# Patient Record
Sex: FEMALE | Race: WHITE
[De-identification: names, ages, dates, MRNs, and addresses within clinical notes are randomized per-mention and may not be internally consistent; named-entity substitution may affect disease eponyms.]

---

## 2019-12-18 ENCOUNTER — HOSPITAL ENCOUNTER (OUTPATIENT)
Dept: HOSPITAL 72 - SUR | Age: 34
Discharge: HOME | End: 2019-12-18
Payer: COMMERCIAL

## 2019-12-18 VITALS — DIASTOLIC BLOOD PRESSURE: 50 MMHG | SYSTOLIC BLOOD PRESSURE: 112 MMHG

## 2019-12-18 VITALS — SYSTOLIC BLOOD PRESSURE: 107 MMHG | DIASTOLIC BLOOD PRESSURE: 70 MMHG

## 2019-12-18 VITALS — DIASTOLIC BLOOD PRESSURE: 46 MMHG | SYSTOLIC BLOOD PRESSURE: 113 MMHG

## 2019-12-18 VITALS — HEIGHT: 64 IN | BODY MASS INDEX: 23.9 KG/M2 | WEIGHT: 140 LBS

## 2019-12-18 VITALS — DIASTOLIC BLOOD PRESSURE: 66 MMHG | SYSTOLIC BLOOD PRESSURE: 91 MMHG

## 2019-12-18 VITALS — SYSTOLIC BLOOD PRESSURE: 117 MMHG | DIASTOLIC BLOOD PRESSURE: 60 MMHG

## 2019-12-18 VITALS — DIASTOLIC BLOOD PRESSURE: 67 MMHG | SYSTOLIC BLOOD PRESSURE: 110 MMHG

## 2019-12-18 VITALS — DIASTOLIC BLOOD PRESSURE: 66 MMHG | SYSTOLIC BLOOD PRESSURE: 107 MMHG

## 2019-12-18 VITALS — DIASTOLIC BLOOD PRESSURE: 80 MMHG | SYSTOLIC BLOOD PRESSURE: 120 MMHG

## 2019-12-18 VITALS — SYSTOLIC BLOOD PRESSURE: 114 MMHG | DIASTOLIC BLOOD PRESSURE: 43 MMHG

## 2019-12-18 DIAGNOSIS — L72.8: Primary | ICD-10-CM

## 2019-12-18 PROCEDURE — 94003 VENT MGMT INPAT SUBQ DAY: CPT

## 2019-12-18 PROCEDURE — 11401 EXC TR-EXT B9+MARG 0.6-1 CM: CPT

## 2019-12-18 PROCEDURE — 81025 URINE PREGNANCY TEST: CPT

## 2019-12-18 PROCEDURE — 94150 VITAL CAPACITY TEST: CPT

## 2019-12-18 NOTE — PRE-PROCEDURE NOTE/ATTESTATION
Pre-Procedure Note/Attestation


Complete Prior to Procedure


Planned Procedure:  right


Procedure Narrative:


Excision of cyst right ankle





Indications for Procedure


Pre-Operative Diagnosis:


Painful cyst right ankle





Attestation


I attest that I discussed the nature of the procedure; its benefits; risks and 

complications; and alternatives (and the risks and benefits of such alternatives

), prior to the procedure, with the patient (or the patient's legal 

representative).





I attest that, if there was a reasonable possibility of needing a blood 

transfusion, the patient (or the patient's legal representative) was given the 

[facility] standardized written summary, pursuant 

to the Dwaine Rahul Blood Safety Act ([place] Health and Safety Code # 1645, as 

amended).





I attest that I re-evaluated the patient just prior to the surgery and that 

there has been no change in the patient's H&P, except as documented below:











Rob Carrillo DPM Dec 18, 2019 07:35

## 2019-12-18 NOTE — OPERATIVE NOTE - DICTATED
DATE OF OPERATION:  12/18/2019

SURGEON:  Rob Carrillo D.P.M.



ANESTHESIA:  Local standby.



PREOPERATIVE DIAGNOSIS:  Painful cyst, right ankle.



POSTOPERATIVE DIAGNOSIS:  Painful cyst, right ankle.



PROCEDURE PERFORMED:  Excision of cyst right ankle.



DESCRIPTION OF THE OPERATION:  The patient was brought to the operating

room and was placed on the operating room table in the supine position.

Intravenous sedation was administered by the anesthesiologist.  Local

anesthesia consisting of 0.5% Marcaine plain, total of 10 mL was

administered to the right ankle and ankle tourniquet was applied to the

right lower extremity.  The foot was prepped and draped in the usual

sterile manner.  An Esmarch bandage was utilized to exsanguinate the blood

and the right ankle tourniquet was inflated to 250 mmHg. Attention was

directed to the dorsal lateral aspect of the right ankle just above the

lateral malleolus.  A 4 cm linear incision was centered over the palpable

cyst.  Incision was deepened utilizing sharp and blunt dissection with

care being taken to cauterize and ligate any bleeders.  At the level of

the soft tissue, multiple small cystic like tissue were identified and

excised in total.  No solitary cyst was identified.  The wound was flushed

utilizing sterile saline.  The wound was inspected again and no abnormal

tissue was observed.  The subcutaneous tissue was then reapproximated

utilizing 4-0 Vicryl.  The skin was then reapproximated utilizing 4-0

nylon in a simple interrupted type stitch.  The wound was dressed

utilizing Xeroform, 2 x 2 gauze, and 3 inch Isaiah.  The right ankle

tourniquet was deflated and vascular supply was noted to all digits of

right foot.









  ______________________________________________

  Rob Carrillo D.P.M.





DR:  Valerie

D:  12/18/2019 16:36

T:  12/18/2019 20:11

JOB#:  [id number]

CC:



CAMERON

## 2019-12-18 NOTE — ANETHESIA PREOPERATIVE EVAL
Anesthesia Pre-op PMH/ROS


General


Date of Evaluation:  Dec 18, 2019


Time of Evaluation:  07:30


Anesthesiologist:  winston


ASA Score:  ASA 1


Mallampati Score


Class I : Soft palate, uvula, fauces, pillars visible


Class II: Soft palate, uvula, fauces visible


Class III: Soft palate, base of uvula visible


Class IV: Only hard plate visible


Mallampati Classification:  Class II


Surgeon:  juan carlos


Diagnosis:  cyst ankle


Surgical Procedure:  excision of ankle cyst


Anesthesia History:  none


Family History:  no anesthesia problems


Medications:  see eMAR


Patient NPO?:  Yes


NPO Date:  Dec 18, 2019


NPO Time:  00:01





Past Medical History


Cardiovascular:  Denies: HTN, CAD, MI, valve dz, arrhythmia, other


Pulmonary:  Denies: asthma, COPD, DIEGO, other


Neurologic/Psychiatric:  Denies: dementia, CVA, depression/anxiety, TIA, other


Endocrine:  Denies: DM, hypothyroidism, steroids, other


HEENT:  Denies: cataract (L), cataract (R), glaucoma, [place] (L), [place] (R), other


Hematology/Immune:  Denies: anemia, DVT, bleeding disorder, other


Musculoskeletal/Integumentary:  Denies: OA, RA, DJD, DDD, edema, other


PSxH Narrative:


septoplasty





Anesthesia Pre-op Phys. Exam


Physician Exam





Last Vital Signs








  Date Time  Temp Pulse Resp B/P (MAP) Pulse Ox O2 Delivery O2 Flow Rate FiO2


 


12/18/19 08:36  61 21 113/46 99 Room Air  


 


12/18/19 08:31 97.8       








Constitutional:  NAD


Neurologic:  CN 2-12 intact


Cardiovascular:  RRR


Respiratory:  CTA


Gastrointestinal:  S/NT/ND





Airway Exam


Mallampati Classification


2


Mallampati Score:  Class II


MO:  full


ROM:  full


Dentures:  no upper, no lower





Anesthesia Pre-op A/P


Labs


Urine Pregnancy Test











Test


  12/18/19


06:30


 


Urine HCG, Qualitative


  Negative


(NEGATIVE)











Studies


Pre-op Studies:  EKG - SR





Risk Assessment & Plan


Plan:


MAC


Status Change Before Surgery:  No





Pre-Antibiotics


Drug:  Ancef


Given Within 1 Hr of Incision:  Yes


Time Given:  07:35











Marycruz Sherwood CRNA Dec 18, 2019 08:46

## 2019-12-18 NOTE — IMMEDIATE POST-OP EVALUATION
Immediate Post-Op Evalulation


Immediate Post-Op Evalulation


Procedure:  excision of ankle cyst


Date of Evaluation:  Dec 18, 2019


Time of Evaluation:  08:47


IV Fluids:  800


Estimated Blood Loss:  2


Blood Pressure Systolic:  112


Blood Pressure Diastolic:  75


Pulse Rate:  70


Respiratory Rate:  14


O2 Sat by Pulse Oximetry:  98


Temperature (Fahrenheit):  97.5


Nausea:  No


Vomiting:  No


Patient Status:  awake, reacts, patent


Hydration Status:  adequate


Drug:  ancef


Given Within 1 Hr of Incision:  Yes


Time Given:  07:40











Marycruz Shrewood CRNA Dec 18, 2019 08:47

## 2019-12-18 NOTE — BRIEF OPERATIVE NOTE
Immediate Post Operative Note


Operative Note


Pre-op Diagnosis:


Painful cyst right ankle


Post-op Diagnosis:  same as pre-op


Surgeon:  Gerardo


Anesthesia:  MAC


Specimen:  yes


Complications:  none


Condition:  stable


Fluids:  250


Estimated Blood Loss:  none


Implant(s) used?:  No











Rob Carrillo DPM Dec 18, 2019 08:33

## 2019-12-18 NOTE — HISTORY AND PHYSICAL REPORT
DATE OF ADMISSION:  12/18/2019

PODIATRIC HISTORY AND PHYSICAL



HISTORY OF PRESENT ILLNESS:  This is a 34-year-old white female that has

been under my care for the past 2 months for painful cyst in her right

ankle.  The patient has been complaining from pain to direct pressure over

her ankle and also burning type sensation and tingling from her distal leg

all the way to her third and fourth toes right foot.  The patient was

treated conservatively with an attempt to aspirate the cyst and when the

symptoms continued, the patient elected to undergo surgical excision of

the cyst.



PAST MEDICAL HISTORY:  Unremarkable.  The patient underwent septal surgery

earlier this year.



MEDICATIONS:  None.



ALLERGIES:  The patient is allergic to sea food.



PODIATRIC PHYSICAL EXAMINATION:

VASCULAR:  The dorsalis pedis and posterior tibial arteries are equally

strong and palpable measuring 3/4 bilaterally.  Capillary filling time is

less than 3 seconds to all digits bilaterally.  No varicosities are noted.

Homans sign is negative.

NEUROLOGICAL:  Reveals intact reflexes, Achilles, and patellar measuring

2/4 fourth bilaterally.  Sensation, proprioception, and vibrations are all

intact bilateral lower extremity.  Babinski is negative.  Clonus is absent

bilaterally.  Positive Tinel sign is noted over the right dorsal ankle.

MUSCULOSKELETAL:  Reveals no abnormalities or deformities.  There is firm

palpable nodule just proximal to the dorsal lateral aspect of the right

ankle.  The nodule is somewhat movable to palpation.

DERMATOLOGICAL:  Reveals no lesions or scars bilaterally.  All nails are

present and healthy bilaterally.



MRI examination dated 12/04/2019 reveals mild posterior tibialis

tendinosis, prior sprain of the anterior talofibular ligament, and lobular

bilobed fluid signal located over the anterior inferior tibiofibular

ligament, right ankle.



ASSESSMENT:  Painful cyst, right ankle.



PLAN:  The patient is admitted today for an excision of cyst, right ankle.

Risks, complications, and alternatives discussed with the patient.

Postoperative instructions were given.  Postoperative medications were not

dispensed.  The patient had leftover pain medications from prior surgeries

earlier this year.









  ______________________________________________

  Rob Carrillo D.P.M.





DR:  CALIST[place]

D:  12/18/2019 13:53

T:  12/18/2019 18:09

JOB#:  [id number]

CC:



CAMERON